# Patient Record
Sex: MALE | URBAN - METROPOLITAN AREA
[De-identification: names, ages, dates, MRNs, and addresses within clinical notes are randomized per-mention and may not be internally consistent; named-entity substitution may affect disease eponyms.]

---

## 2023-05-15 ENCOUNTER — NURSE TRIAGE (OUTPATIENT)
Dept: ADMINISTRATIVE | Facility: CLINIC | Age: 38
End: 2023-05-15

## 2023-05-16 NOTE — TELEPHONE ENCOUNTER
[place] patient.     C/o left testicle pain 3/10 x 2 days. Also c/o abdominal pain that began today. Advised per protocol to go to the nearest ED if patient is concerned. Patient VU. Advised the patient to call back with any further questions or if symptoms worsen.        Reason for Disposition   Health Information question, no triage required and triager able to answer question    Protocols used: Information Only Call - No Triage-A-